# Patient Record
Sex: FEMALE | Race: WHITE | URBAN - METROPOLITAN AREA
[De-identification: names, ages, dates, MRNs, and addresses within clinical notes are randomized per-mention and may not be internally consistent; named-entity substitution may affect disease eponyms.]

---

## 2021-05-31 ENCOUNTER — RECORDS - HEALTHEAST (OUTPATIENT)
Dept: ADMINISTRATIVE | Facility: CLINIC | Age: 36
End: 2021-05-31

## 2022-10-03 ENCOUNTER — APPOINTMENT (RX ONLY)
Dept: URBAN - METROPOLITAN AREA CLINIC 302 | Facility: CLINIC | Age: 37
Setting detail: DERMATOLOGY
End: 2022-10-03

## 2022-10-03 DIAGNOSIS — L40.59 OTHER PSORIATIC ARTHROPATHY: ICD-10-CM

## 2022-10-03 DIAGNOSIS — L71.8 OTHER ROSACEA: ICD-10-CM

## 2022-10-03 DIAGNOSIS — L40.0 PSORIASIS VULGARIS: ICD-10-CM

## 2022-10-03 PROCEDURE — ? PRESCRIPTION

## 2022-10-03 PROCEDURE — 99204 OFFICE O/P NEW MOD 45 MIN: CPT

## 2022-10-03 PROCEDURE — ? COUNSELING

## 2022-10-03 PROCEDURE — ? ORDER TESTS

## 2022-10-03 RX ORDER — DOXYCYCLINE HYCLATE 100 MG/1
CAPSULE, GELATIN COATED ORAL
Qty: 120 | Refills: 0 | Status: ERX | COMMUNITY
Start: 2022-10-03

## 2022-10-03 RX ORDER — TAPINAROF 10 MG/1000MG
CREAM TOPICAL
Qty: 60 | Refills: 2 | Status: ERX | COMMUNITY
Start: 2022-10-03

## 2022-10-03 RX ORDER — TILDRAKIZUMAB-ASMN 100 MG/ML
INJECTION, SOLUTION SUBCUTANEOUS
Qty: 4 | Refills: 1 | Status: ERX | COMMUNITY
Start: 2022-10-03

## 2022-10-03 RX ORDER — METRONIDAZOLE 7.5 MG/G
CREAM TOPICAL
Qty: 45 | Refills: 2 | Status: ERX | COMMUNITY
Start: 2022-10-03

## 2022-10-03 RX ADMIN — DOXYCYCLINE HYCLATE: 100 CAPSULE, GELATIN COATED ORAL at 00:00

## 2022-10-03 RX ADMIN — TILDRAKIZUMAB-ASMN: 100 INJECTION, SOLUTION SUBCUTANEOUS at 00:00

## 2022-10-03 RX ADMIN — TAPINAROF: 10 CREAM TOPICAL at 00:00

## 2022-10-03 RX ADMIN — METRONIDAZOLE: 7.5 CREAM TOPICAL at 00:00

## 2022-10-03 ASSESSMENT — LOCATION ZONE DERM
LOCATION ZONE: ARM
LOCATION ZONE: LEG
LOCATION ZONE: NECK
LOCATION ZONE: TRUNK
LOCATION ZONE: FACE
LOCATION ZONE: FEET

## 2022-10-03 ASSESSMENT — LOCATION DETAILED DESCRIPTION DERM
LOCATION DETAILED: LEFT INFERIOR POSTERIOR NECK
LOCATION DETAILED: LEFT PROXIMAL DORSAL FOREARM
LOCATION DETAILED: SUPERIOR MID FOREHEAD
LOCATION DETAILED: PERIUMBILICAL SKIN
LOCATION DETAILED: RIGHT PROXIMAL DORSAL FOREARM
LOCATION DETAILED: LEFT PROXIMAL PRETIBIAL REGION
LOCATION DETAILED: RIGHT HEEL
LOCATION DETAILED: RIGHT PROXIMAL PRETIBIAL REGION
LOCATION DETAILED: LEFT HEEL
LOCATION DETAILED: RIGHT INFERIOR MEDIAL MIDBACK

## 2022-10-03 ASSESSMENT — BSA PSORIASIS: % BODY COVERED IN PSORIASIS: 30

## 2022-10-03 ASSESSMENT — LOCATION SIMPLE DESCRIPTION DERM
LOCATION SIMPLE: ABDOMEN
LOCATION SIMPLE: LEFT HEEL
LOCATION SIMPLE: RIGHT FOREARM
LOCATION SIMPLE: SUPERIOR FOREHEAD
LOCATION SIMPLE: LEFT FOREARM
LOCATION SIMPLE: LEFT PRETIBIAL REGION
LOCATION SIMPLE: POSTERIOR NECK
LOCATION SIMPLE: RIGHT PRETIBIAL REGION
LOCATION SIMPLE: RIGHT HEEL
LOCATION SIMPLE: RIGHT LOWER BACK

## 2022-10-03 NOTE — PROCEDURE: COUNSELING
Patient Specific Counseling (Will Not Stick From Patient To Patient): rosacea is moderate to severe as far as pimples & pustules - will add on doxycycline and metronidazole cream and recheck in 1 month
Detail Level: Detailed
Detail Level: Zone
Patient Specific Counseling (Will Not Stick From Patient To Patient): pt. is afraid of needles but is ok with us injecting her q 3 months in office so will do Ilumya; she currently doesn't have a PMD but is looking to get one; pt. has PsO of her forehead near her scalp, also of her scalp, her buttocks and low back, her abdomen and arms and legs - IT IS SEVERE
Patient Specific Counseling (Will Not Stick From Patient To Patient): pt. has some swelling of her right index finger sometimes; will have her f/u with Rheumatology

## 2022-10-03 NOTE — HPI: RASH
What Type Of Note Output Would You Prefer (Optional)?: Bullet Format
How Severe Is Your Rash?: moderate
Is This A New Presentation, Or A Follow-Up?: Rash
Additional History: Per reports this is psoriasis

## 2022-10-03 NOTE — PROCEDURE: ORDER TESTS
Billing Type: Third-Party Bill
Performing Laboratory: 0
Expected Date Of Service: 10/03/2022
Bill For Surgical Tray: no

## 2022-10-05 RX ORDER — TILDRAKIZUMAB-ASMN 100 MG/ML
INJECTION, SOLUTION SUBCUTANEOUS
Qty: 4 | Refills: 1 | Status: ERX

## 2022-11-01 ENCOUNTER — APPOINTMENT (RX ONLY)
Dept: URBAN - METROPOLITAN AREA CLINIC 302 | Facility: CLINIC | Age: 37
Setting detail: DERMATOLOGY
End: 2022-11-01

## 2022-11-01 DIAGNOSIS — L40.0 PSORIASIS VULGARIS: ICD-10-CM | Status: INADEQUATELY CONTROLLED

## 2022-11-01 DIAGNOSIS — L71.8 OTHER ROSACEA: ICD-10-CM

## 2022-11-01 PROCEDURE — ? COUNSELING

## 2022-11-01 PROCEDURE — ? BIOLOGIC INJECTION TRAINING

## 2022-11-01 PROCEDURE — ? PRESCRIPTION MEDICATION MANAGEMENT

## 2022-11-01 PROCEDURE — 99214 OFFICE O/P EST MOD 30 MIN: CPT

## 2022-11-01 ASSESSMENT — LOCATION ZONE DERM: LOCATION ZONE: TRUNK

## 2022-11-01 ASSESSMENT — LOCATION DETAILED DESCRIPTION DERM: LOCATION DETAILED: RIGHT LATERAL ABDOMEN

## 2022-11-01 ASSESSMENT — LOCATION SIMPLE DESCRIPTION DERM: LOCATION SIMPLE: ABDOMEN

## 2022-11-01 NOTE — PROCEDURE: COUNSELING
Patient Specific Counseling (Will Not Stick From Patient To Patient): rosacea is moderate to severe as far as pimples & pustules - will add on doxycycline and metronidazole cream and recheck in 1 month\\n\\nPt. is improving and will continue current regimen of doxycycline 100mg BID and metronidazole cream BID and will recheck in a month (when she stops the doxy)

## 2022-11-01 NOTE — PROCEDURE: COUNSELING
Patient Specific Counseling (Will Not Stick From Patient To Patient): pt. has plaques all over her body but they are doing somewhat better on the Vtama she's been using daily\\n\\nPATIENT WILL BE SHIPPED A SECOND ILUMYA INJECTION THIS MONTH AS PART OF THE [facility] FREE DRUG PROGRAM; WE ARE TRYING TO APPEAL HER CASE TO HER INSURANCE AS THEY DENIED HER PRIOR AUTH FOR ILUMYA Patient Specific Counseling (Will Not Stick From Patient To Patient): pt. has plaques all over her body but they are doing somewhat better on the Vtama she's been using daily\\n\\nPATIENT WILL BE SHIPPED A SECOND ILUMYA INJECTION THIS MONTH AS PART OF THE [facility] [place] FREE DRUG PROGRAM; WE ARE TRYING TO APPEAL HER CASE TO HER INSURANCE AS THEY DENIED HER PRIOR AUTH FOR ILUMYA

## 2022-11-01 NOTE — PROCEDURE: BIOLOGIC INJECTION TRAINING
Ilumya Training Text: We discussed Ilumya injection.  I demonstrated how to clean the skin and administer the medication.\\n\\nLOT: [id number]\\nEXP: 06/2024 Ilumya Training Text: We discussed Ilumya injection.  I demonstrated how to clean the skin and administer the medication.\\n\\nLOT: [id number]\\nEXP: 06/2024

## 2022-12-02 ENCOUNTER — APPOINTMENT (RX ONLY)
Dept: URBAN - METROPOLITAN AREA CLINIC 302 | Facility: CLINIC | Age: 37
Setting detail: DERMATOLOGY
End: 2022-12-02

## 2022-12-02 DIAGNOSIS — L40.59 OTHER PSORIATIC ARTHROPATHY: ICD-10-CM

## 2022-12-02 DIAGNOSIS — L40.0 PSORIASIS VULGARIS: ICD-10-CM

## 2022-12-02 DIAGNOSIS — L71.8 OTHER ROSACEA: ICD-10-CM

## 2022-12-02 PROCEDURE — 99214 OFFICE O/P EST MOD 30 MIN: CPT

## 2022-12-02 PROCEDURE — ? COUNSELING

## 2022-12-02 PROCEDURE — ? BIOLOGIC INJECTION TRAINING

## 2022-12-02 PROCEDURE — ? PRESCRIPTION MEDICATION MANAGEMENT

## 2022-12-02 ASSESSMENT — LOCATION DETAILED DESCRIPTION DERM
LOCATION DETAILED: LEFT PROXIMAL PRETIBIAL REGION
LOCATION DETAILED: LEFT PROXIMAL DORSAL FOREARM
LOCATION DETAILED: RIGHT PROXIMAL PRETIBIAL REGION
LOCATION DETAILED: RIGHT SECOND DISTAL INTERPHALANGEAL JOINT
LOCATION DETAILED: RIGHT PROXIMAL DORSAL FOREARM
LOCATION DETAILED: RIGHT LATERAL ABDOMEN
LOCATION DETAILED: RIGHT SECOND METACARPOPHALANGEAL JOINT

## 2022-12-02 ASSESSMENT — LOCATION SIMPLE DESCRIPTION DERM
LOCATION SIMPLE: RIGHT SECOND DIP
LOCATION SIMPLE: LEFT FOREARM
LOCATION SIMPLE: RIGHT PRETIBIAL REGION
LOCATION SIMPLE: RIGHT FOREARM
LOCATION SIMPLE: RIGHT SECOND MP
LOCATION SIMPLE: LEFT PRETIBIAL REGION
LOCATION SIMPLE: ABDOMEN

## 2022-12-02 ASSESSMENT — LOCATION ZONE DERM
LOCATION ZONE: FINGER
LOCATION ZONE: FINGER
LOCATION ZONE: LEG
LOCATION ZONE: TRUNK
LOCATION ZONE: ARM

## 2022-12-02 NOTE — PROCEDURE: PRESCRIPTION MEDICATION MANAGEMENT
Continue Regimen: continue Vtama on legs once daily, can d/c on arms/stomach as plaques now gone
Detail Level: Zone
Render In Strict Bullet Format?: No
Continue Regimen: doxycycline 100mg daily w/ food - can stop in a month if no new pimples or papules\\n\\ncontinue metronidazole cream BID on face
Plan: if redness still pronounced in a few months will rx Rhofade and send her to [facility] for Vbeam txment if she desires

## 2022-12-02 NOTE — PROCEDURE: BIOLOGIC INJECTION TRAINING
Ilumya Training Text: Ilumya 100mg injected subcu into the pt.'s right abdomen today\\n\\nLOT: [id number]\\nEXP: 06/2024\\nNDC: 33552-615-27\\n\\nThe pt. will f/u at the infusion center in 3 months for her next injection Ilumya Training Text: Ilumya 100mg injected subcu into the pt.'s right abdomen today\\n\\nLOT: [id number]\\nEXP: 06/2024\\nNDC: 15897-303-09\\n\\nThe pt. will f/u at the infusion center in 3 months for her next injection

## 2022-12-02 NOTE — PROCEDURE: COUNSELING
Patient Specific Counseling (Will Not Stick From Patient To Patient): Pt. says her PsA of her index finger and swelling are doing better on the Ilumya; will re-eval in 4 mos.; if still having PsA issues will refer to Rheum and consider switching up her biologic medication

## 2022-12-02 NOTE — PROCEDURE: COUNSELING
Patient Specific Counseling (Will Not Stick From Patient To Patient): pt. will have her next injection at the [facility] infusion center in 3 mos.\\n\\nShe was told to continue Vtama daily to her legs until plaques gone but to stop it on her arms and abdomen since those plaques now fully gone Patient Specific Counseling (Will Not Stick From Patient To Patient): pt. will have her next injection at the [facility] infusion center in 3 mos.\\n\\nShe was told to continue Vtama daily to her legs until plaques gone but to stop it on her arms and abdomen since those plaques now fully gone

## 2022-12-02 NOTE — PROCEDURE: COUNSELING
Patient Specific Counseling (Will Not Stick From Patient To Patient): rosacea is moderate to severe as far as pimples & pustules - will add on doxycycline and metronidazole cream and recheck in 1 month

## 2023-04-12 ENCOUNTER — APPOINTMENT (RX ONLY)
Dept: URBAN - METROPOLITAN AREA CLINIC 304 | Facility: CLINIC | Age: 38
Setting detail: DERMATOLOGY
End: 2023-04-12

## 2023-04-12 DIAGNOSIS — L40.0 PSORIASIS VULGARIS: ICD-10-CM

## 2023-04-12 DIAGNOSIS — L71.8 OTHER ROSACEA: ICD-10-CM | Status: INADEQUATELY CONTROLLED

## 2023-04-12 DIAGNOSIS — L40.59 OTHER PSORIATIC ARTHROPATHY: ICD-10-CM

## 2023-04-12 PROCEDURE — ? COUNSELING

## 2023-04-12 PROCEDURE — ? PRESCRIPTION MEDICATION MANAGEMENT

## 2023-04-12 PROCEDURE — 99214 OFFICE O/P EST MOD 30 MIN: CPT

## 2023-04-12 PROCEDURE — ? PRESCRIPTION

## 2023-04-12 RX ORDER — IVERMECTIN 10 MG/G
CREAM TOPICAL
Qty: 45 | Refills: 2 | Status: CANCELLED

## 2023-04-12 RX ORDER — PHARMACY COMPOUNDING ACCESSORY
EACH MISCELLANEOUS
Qty: 45 | Refills: 4 | Status: ERX | COMMUNITY
Start: 2023-04-12

## 2023-04-12 RX ORDER — TAPINAROF 10 MG/1000MG
CREAM TOPICAL
Qty: 60 | Refills: 2 | Status: ERX

## 2023-04-12 RX ORDER — IXEKIZUMAB 80 MG/ML
INJECTION, SOLUTION SUBCUTANEOUS Q4WEEKS
Qty: 4 | Refills: 2 | Status: ERX | COMMUNITY
Start: 2023-04-12

## 2023-04-12 RX ORDER — CLOBETASOL PROPIONATE 0.5 MG/ML
SOLUTION TOPICAL
Qty: 50 | Refills: 2 | Status: ERX | COMMUNITY
Start: 2023-04-12

## 2023-04-12 RX ADMIN — CLOBETASOL PROPIONATE: 0.5 SOLUTION TOPICAL at 00:00

## 2023-04-12 RX ADMIN — IXEKIZUMAB: 80 INJECTION, SOLUTION SUBCUTANEOUS at 00:00

## 2023-04-12 RX ADMIN — Medication: at 00:00

## 2023-04-12 ASSESSMENT — LOCATION DETAILED DESCRIPTION DERM
LOCATION DETAILED: RIGHT SECOND DISTAL INTERPHALANGEAL JOINT
LOCATION DETAILED: RIGHT SECOND METACARPOPHALANGEAL JOINT

## 2023-04-12 ASSESSMENT — LOCATION ZONE DERM
LOCATION ZONE: FINGER
LOCATION ZONE: FINGER

## 2023-04-12 ASSESSMENT — LOCATION SIMPLE DESCRIPTION DERM
LOCATION SIMPLE: RIGHT SECOND DIP
LOCATION SIMPLE: RIGHT SECOND MP

## 2023-04-12 NOTE — PROCEDURE: PRESCRIPTION MEDICATION MANAGEMENT
Discontinue Regimen: Ilumya
Continue Regimen: continue Vtama on AA daily prn
Initiate Treatment: clobetasol scalp soln BID up to two weeks per a month prn\\nTaltz
Detail Level: Zone
Render In Strict Bullet Format?: No
Continue Regimen: doxycycline 100mg daily w/ food - can stop in a month if no new pimples or papules\\n\\ncontinue metronidazole cream BID on face
Plan: if redness still pronounced in a few months will rx Rhofade and send her to [facility] for Vbeam txment if she desires

## 2023-04-12 NOTE — PROCEDURE: COUNSELING
Patient Specific Counseling (Will Not Stick From Patient To Patient): Pt had Covid in March and feels her psoriasis has worsened since and that the Ilumya has stopped working; she's been on it 6 mos. now; also her joint pain of her right index finger was better but now worsening. She stopped using her Vtama cream b/c she ran out but never requested a refill\\nPsO on abdomen better, but the plaques on her face and left knee are coming back and her scalp is getting itchy again
Detail Level: Simple
Detail Level: Detailed
Patient Specific Counseling (Will Not Stick From Patient To Patient): Pt. says her PsA of her index finger and swelling are doing better on the Ilumya but have worsened the past month or two after she got Covid in March - will refer her to [facility] and switch her over to Taltz; she thinks now she will be able to give herself the injections at home or she can have a friend do it
Patient Specific Counseling (Will Not Stick From Patient To Patient): Pt stopped using the metronidazole a month or so again after she had COVID. Advised pt to start this again qam and will rx topical ivermectin for her to use QHS\\nshe gets pimples/pustules/redness\\nwill recheck in a cpl mos.

## 2023-05-24 ENCOUNTER — APPOINTMENT (RX ONLY)
Dept: URBAN - METROPOLITAN AREA CLINIC 304 | Facility: CLINIC | Age: 38
Setting detail: DERMATOLOGY
End: 2023-05-24

## 2023-05-24 DIAGNOSIS — L72.8 OTHER FOLLICULAR CYSTS OF THE SKIN AND SUBCUTANEOUS TISSUE: ICD-10-CM | Status: INADEQUATELY CONTROLLED

## 2023-05-24 DIAGNOSIS — L40.0 PSORIASIS VULGARIS: ICD-10-CM

## 2023-05-24 PROCEDURE — 99213 OFFICE O/P EST LOW 20 MIN: CPT | Mod: 25

## 2023-05-24 PROCEDURE — ? TALTZ INJECTION

## 2023-05-24 PROCEDURE — ? PRESCRIPTION

## 2023-05-24 PROCEDURE — 96372 THER/PROPH/DIAG INJ SC/IM: CPT

## 2023-05-24 PROCEDURE — ? COUNSELING

## 2023-05-24 PROCEDURE — ? PRESCRIPTION MEDICATION MANAGEMENT

## 2023-05-24 RX ORDER — DOXYCYCLINE HYCLATE 100 MG/1
TABLET, COATED ORAL BID
Qty: 14 | Refills: 0 | Status: ERX | COMMUNITY
Start: 2023-05-24

## 2023-05-24 RX ORDER — IXEKIZUMAB 80 MG/ML
INJECTION, SOLUTION SUBCUTANEOUS Q4WEEKS
Qty: 4 | Refills: 2 | Status: CANCELLED

## 2023-05-24 RX ADMIN — DOXYCYCLINE HYCLATE: 100 TABLET, COATED ORAL at 00:00

## 2023-05-24 ASSESSMENT — LOCATION SIMPLE DESCRIPTION DERM
LOCATION SIMPLE: ABDOMEN
LOCATION SIMPLE: LEFT AXILLARY VAULT

## 2023-05-24 ASSESSMENT — LOCATION DETAILED DESCRIPTION DERM
LOCATION DETAILED: LEFT AXILLARY VAULT
LOCATION DETAILED: PERIUMBILICAL SKIN
LOCATION DETAILED: RIGHT LATERAL ABDOMEN

## 2023-05-24 ASSESSMENT — LOCATION ZONE DERM
LOCATION ZONE: TRUNK
LOCATION ZONE: AXILLAE

## 2023-05-24 NOTE — PROCEDURE: COUNSELING
Patient Specific Counseling (Will Not Stick From Patient To Patient): Pt's PsO doing much better on Vtama\\n\\npt. saw a Rheumatologist who is ok w/ us singh w/ Taltz for her PsA and he will continue to follow her as well
Detail Level: Simple
Detail Level: Detailed
Patient Specific Counseling (Will Not Stick From Patient To Patient): pt. says this painful bump just came up a few days ago\\nshe doesn't normally get cysts in the axilla or groin or under her breasts so doubt HS at this point\\n\\nd/w/p doing an ILK injection but she declined it\\nwill rx one week of doxy and have her do warm compresses and RTC if worsening

## 2023-05-24 NOTE — PROCEDURE: PRESCRIPTION MEDICATION MANAGEMENT
Discontinue Regimen: Ilumya
Continue Regimen: continue Vtama on AA daily prn
Initiate Treatment: clobetasol scalp soln BID up to two weeks per a month prn\\nTaltz
Detail Level: Zone
Render In Strict Bullet Format?: No

## 2023-10-11 ENCOUNTER — APPOINTMENT (RX ONLY)
Dept: URBAN - METROPOLITAN AREA CLINIC 304 | Facility: CLINIC | Age: 38
Setting detail: DERMATOLOGY
End: 2023-10-11

## 2023-10-11 DIAGNOSIS — B00.1 HERPESVIRAL VESICULAR DERMATITIS: ICD-10-CM

## 2023-10-11 DIAGNOSIS — L71.8 OTHER ROSACEA: ICD-10-CM | Status: INADEQUATELY CONTROLLED

## 2023-10-11 DIAGNOSIS — L40.0 PSORIASIS VULGARIS: ICD-10-CM | Status: WELL CONTROLLED

## 2023-10-11 PROCEDURE — ? PRESCRIPTION MEDICATION MANAGEMENT

## 2023-10-11 PROCEDURE — ? ORDER TESTS

## 2023-10-11 PROCEDURE — ? PRESCRIPTION

## 2023-10-11 PROCEDURE — ? COUNSELING

## 2023-10-11 PROCEDURE — 99214 OFFICE O/P EST MOD 30 MIN: CPT

## 2023-10-11 RX ORDER — DOXYCYCLINE HYCLATE 100 MG/1
CAPSULE, GELATIN COATED ORAL
Qty: 28 | Refills: 4 | Status: CANCELLED

## 2023-10-11 RX ORDER — CLOBETASOL PROPIONATE 0.5 MG/ML
SOLUTION TOPICAL
Qty: 50 | Refills: 3 | Status: ERX

## 2023-10-11 RX ORDER — DOXYCYCLINE 40 MG/1
CAPSULE ORAL
Qty: 90 | Refills: 1 | Status: ERX | COMMUNITY
Start: 2023-10-11

## 2023-10-11 RX ORDER — IXEKIZUMAB 80 MG/ML
INJECTION, SOLUTION SUBCUTANEOUS Q4WEEKS
Qty: 3 | Refills: 1 | Status: ERX

## 2023-10-11 RX ORDER — VALACYCLOVIR HYDROCHLORIDE 1 G/1
TABLET, FILM COATED ORAL Q12 HOURS
Qty: 20 | Refills: 1 | Status: ERX | COMMUNITY
Start: 2023-10-11

## 2023-10-11 RX ORDER — PHARMACY COMPOUNDING ACCESSORY
EACH MISCELLANEOUS
Qty: 45 | Refills: 4 | Status: ERX | COMMUNITY
Start: 2023-10-11

## 2023-10-11 RX ADMIN — VALACYCLOVIR HYDROCHLORIDE: 1 TABLET, FILM COATED ORAL at 00:00

## 2023-10-11 RX ADMIN — Medication: at 00:00

## 2023-10-11 RX ADMIN — DOXYCYCLINE: 40 CAPSULE ORAL at 00:00

## 2023-10-11 ASSESSMENT — LOCATION ZONE DERM: LOCATION ZONE: LIP

## 2023-10-11 ASSESSMENT — LOCATION DETAILED DESCRIPTION DERM: LOCATION DETAILED: RIGHT SUPERIOR VERMILION LIP

## 2023-10-11 ASSESSMENT — LOCATION SIMPLE DESCRIPTION DERM: LOCATION SIMPLE: RIGHT LIP

## 2023-10-11 NOTE — PROCEDURE: COUNSELING
Patient Specific Counseling (Will Not Stick From Patient To Patient): pt. denies any joint pain on Taltz - follows w/ a Rheum at [facility] as well\\n\\nher PsO is 100% clear but sometimes her scalp itches, will send an rx for clobetasol soln\\n\\npt. due for her yearly quant gold
Detail Level: Simple
Patient Specific Counseling (Will Not Stick From Patient To Patient): she gets pimples/pustules/redness\\nadmits lately she stopped using the metronidazole q am and topical ivermectin qhs\\nwill start her on Oracea as well - see photo\\nwill recheck in a cpl mos. if not better - she should call to make an appt.\\n\\nI also gave the pt. a sample of Epsolay if she wants to try that one instead of the metronidazole cream
Detail Level: Detailed

## 2023-10-11 NOTE — PROCEDURE: ORDER TESTS
Billing Type: Third-Party Bill
Performing Laboratory: 0
Bill For Surgical Tray: no
Expected Date Of Service: 10/11/2023

## 2024-01-17 NOTE — PROCEDURE: BIOLOGIC INJECTION TRAINING
Stelara Training Text: We discussed Stelara injection.  I demonstrated how to clean the skin and administer the medication. 17-Jan-2024 06:00

## 2024-04-15 ENCOUNTER — APPOINTMENT (RX ONLY)
Dept: URBAN - METROPOLITAN AREA CLINIC 304 | Facility: CLINIC | Age: 39
Setting detail: DERMATOLOGY
End: 2024-04-15

## 2024-04-15 DIAGNOSIS — L71.8 OTHER ROSACEA: ICD-10-CM | Status: INADEQUATELY CONTROLLED

## 2024-04-15 DIAGNOSIS — L72.8 OTHER FOLLICULAR CYSTS OF THE SKIN AND SUBCUTANEOUS TISSUE: ICD-10-CM | Status: STABLE

## 2024-04-15 DIAGNOSIS — L40.0 PSORIASIS VULGARIS: ICD-10-CM | Status: INADEQUATELY CONTROLLED

## 2024-04-15 PROCEDURE — ? PRESCRIPTION MEDICATION MANAGEMENT

## 2024-04-15 PROCEDURE — ? COUNSELING

## 2024-04-15 PROCEDURE — ? ORDER TESTS

## 2024-04-15 PROCEDURE — 99214 OFFICE O/P EST MOD 30 MIN: CPT

## 2024-04-15 PROCEDURE — ? PRESCRIPTION

## 2024-04-15 RX ORDER — DOXYCYCLINE HYCLATE 100 MG/1
CAPSULE, GELATIN COATED ORAL
Qty: 90 | Refills: 0 | Status: ERX | COMMUNITY
Start: 2024-04-15

## 2024-04-15 RX ORDER — AZELAIC ACID 0.15 G/G
GEL TOPICAL
Qty: 50 | Refills: 3 | Status: ERX | COMMUNITY
Start: 2024-04-15

## 2024-04-15 RX ADMIN — AZELAIC ACID: 0.15 GEL TOPICAL at 00:00

## 2024-04-15 RX ADMIN — DOXYCYCLINE HYCLATE: 100 CAPSULE, GELATIN COATED ORAL at 00:00

## 2024-04-15 ASSESSMENT — LOCATION SIMPLE DESCRIPTION DERM
LOCATION SIMPLE: LEFT PRETIBIAL REGION
LOCATION SIMPLE: LEFT CHEEK
LOCATION SIMPLE: RIGHT CHEEK
LOCATION SIMPLE: RIGHT AXILLARY VAULT

## 2024-04-15 ASSESSMENT — LOCATION DETAILED DESCRIPTION DERM
LOCATION DETAILED: LEFT PROXIMAL PRETIBIAL REGION
LOCATION DETAILED: RIGHT AXILLARY VAULT
LOCATION DETAILED: LEFT CENTRAL MALAR CHEEK
LOCATION DETAILED: RIGHT INFERIOR CENTRAL MALAR CHEEK

## 2024-04-15 ASSESSMENT — LOCATION ZONE DERM
LOCATION ZONE: FACE
LOCATION ZONE: LEG
LOCATION ZONE: AXILLAE

## 2024-04-15 NOTE — PROCEDURE: ORDER TESTS
Billing Type: Third-Party Bill
Performing Laboratory: [id number]
Bill For Surgical Tray: no
Expected Date Of Service: 04/15/2024

## 2024-04-15 NOTE — PROCEDURE: PRESCRIPTION MEDICATION MANAGEMENT
Detail Level: Zone
Initiate Treatment: doxycycline hyclate 100 mg capsule\\n\\nazelaic acid 15 % topical gel
Render In Strict Bullet Format?: No

## 2024-04-15 NOTE — HPI: EVALUATION OF SKIN LESION(S)
Hpi Title: Evaluation of a Skin Lesion
How Severe Are Your Spot(S)?: mild
Additional History: Pt reports the lesion gets filled with pus

## 2024-04-29 RX ORDER — IXEKIZUMAB 80 MG/ML
INJECTION, SOLUTION SUBCUTANEOUS Q4WEEKS
Qty: 3 | Refills: 1 | Status: ERX

## 2024-11-28 NOTE — PROCEDURE: PRESCRIPTION MEDICATION MANAGEMENT
Continue Regimen: Scotty
Initiate Treatment: clobetasol 0.05 % scalp solution
Detail Level: Zone
Render In Strict Bullet Format?: No
Continue Regimen: doxycycline 100mg daily w/ food - can stop in a month if no new pimples or papules\\n\\ncontinue metronidazole cream BID on face
Initiate Treatment: Valtrex 1 gram tablet Q12 HOURS
Continue Regimen: metronidazole cream q am\\ntopical ivermectin qhs
Initiate Treatment: oracea 40mg daily
intact

## 2024-12-24 RX ORDER — IXEKIZUMAB 80 MG/ML
INJECTION, SOLUTION SUBCUTANEOUS Q4WEEKS
Qty: 1 | Refills: 0 | Status: ERX

## 2025-01-13 ENCOUNTER — RX ONLY (RX ONLY)
Age: 40
End: 2025-01-13

## 2025-01-13 RX ORDER — IXEKIZUMAB 80 MG/ML
INJECTION, SOLUTION SUBCUTANEOUS Q4WEEKS
Qty: 1 | Refills: 3 | Status: ERX | COMMUNITY
Start: 2025-01-13